# Patient Record
Sex: MALE | Race: BLACK OR AFRICAN AMERICAN
[De-identification: names, ages, dates, MRNs, and addresses within clinical notes are randomized per-mention and may not be internally consistent; named-entity substitution may affect disease eponyms.]

---

## 2017-01-01 NOTE — EMERGENCY ROOM REPORT
History of Present Illness


General


Chief Complaint:  General Complaint


Source:  Patient





Present Illness


HPI


26-year-old male presents to emergency Department complaining of right knee 

pain x2 days 7/10 in severity exacerbated upon walking long distances.  Patient 

denies history of fall, trauma or previous injury.  Patient states he notices 

the pain when he is at work after he has been walking for some time or after 

flexing the knee repeatedly.  Patient denies clicking or instability.  Patient 

denies erythema, swelling or increased pressure palpation.  Patient also 

reports 2 lesions on the anterior left shin with surrounding erythema one with 

overlying pustule the second recently the pustule burst.  Denies injury he 

states he may have had insect bite denies itching.  Reports tenderness to the 

skin about the lesions.  Lastly patient also reports intermittent cough with 

mild nasal congestion denies sore throat, body aches, nausea, vomiting, fevers 

or chills.  She denies any compromise denies history of asthma/COPD. Denies 

numbness tingling or loss of sensation or gross motor movements of the 

extremities, incontinence of bowel or bladder. Denies CP, Palpitations, LOC, AMS

, dizziness, Changes in Vision, Sensation, paresthesias, or a sudden severe 

headache.


Allergies:  


Coded Allergies:  


     No Known Allergies (Unverified , 2/15/16)





Patient History


Past Medical History:  see triage record


Past Surgical History:  none


Pertinent Family History:  none


Immunizations:  UTD


Reviewed Nursing Documentation:  PMH: Agreed, PSxH: Agreed





Nursing Documentation-PM


Past Medical History:  No History, Except For


Hx Hypertension:  Yes


Hx Asthma:  Yes





Review of Systems


All Other Systems:  negative except mentioned in HPI





Physical Exam





Vital Signs








  Date Time  Temp Pulse Resp B/P Pulse Ox O2 Delivery O2 Flow Rate FiO2


 


1/1/17 12:59 98.6 73 18 148/107 97 Room Air  








Sp02 EP Interpretation:  reviewed, normal


General Appearance:  no apparent distress, alert, GCS 15, non-toxic


Head:  normocephalic, atraumatic


Eyes:  bilateral eye PERRL, bilateral eye normal inspection


ENT:  hearing grossly normal, normal pharynx, no angioedema, normal voice, TMs 

+ canals normal, uvula midline, moist mucus membranes


Neck:  full range of motion, supple/symm/no masses


Respiratory:  chest non-tender, lungs clear, normal breath sounds, no rhonchi, 

no respiratory distress, no retraction, no accessory muscle use, no wheezing, 

speaking full sentences


Cardiovascular #1:  regular rate, rhythm, no edema


Cardiovascular #2:  2+ radial (R), 2+ radial (L), 2+ dorsalis pedis (R), 2+ 

dorsalis pedis (L)


Gastrointestinal:  normal bowel sounds, non tender, soft, no guarding, no 

rebound


Rectal:  deferred


Genitourinary:  normal inspection, no CVA tenderness


Musculoskeletal:  back normal, gait/station normal, normal range of motion, no 

calf tenderness, other - no TTP upon palpation to the right knee, no increased 

laxity upon varus or valgus stressing, negative anterior and posterior drawer 

signs Pt. did have positive pain with "grind test" , tender


Neurologic:  alert, oriented x3, responsive, motor strength/tone normal, 

sensory intact, speech normal


Psychiatric:  judgement/insight normal, memory normal, mood/affect normal, no 

suicidal/homicidal ideation


Skin:  normal color, no rash, warm/dry, well hydrated, other - two open lesions 

on the anterior left shin both 0.5cm in diameter, one with pustule noted, both 

have surrounding erythema and mild increased temperature to palpation, no 

evidence of excoriation or infestation.


Lymphatic:  no adenopathy





Procedures


Splinting


Splinting :  


   Consent:  Verbal


   Location:  right knee


   Pre-Made Type:  ACE wrap


   Pre-Proc Neuro Vasc Exam:  normal


   Post-Proc Neuro Vasc Exam:  normal


   Patient Tolerated:  Well


   Complications:  None





Medical Decision Making


PA Attestation


Dr. Gonzalez is my supervising Physician whom patient management has been 

discussed with.


Diagnostic Impression:  


 Primary Impression:  


 Right knee sprain


 Qualified Codes:  S83.91XA - Sprain of unspecified site of right knee, initial 

encounter


 Additional Impressions:  


 Cellulitis


 Qualified Codes:  L03.90 - Cellulitis, unspecified


 URI (upper respiratory infection)


 Qualified Codes:  J06.9 - Acute upper respiratory infection, unspecified; 

B97.89 - Other viral agents as the cause of diseases classified elsewhere


ER Course


Pt. presents to the ED c/o: Right knee pain after walking long distance, 

lesions on the anterior portion, and denies feversx 2 days





Ddx considered but are not limited to Fracture, dislocation, contusion,  Sprain/

Strain/Spasm,  Cellulitis,  URI, insect bite, dermatitis.


Vital signs: are WNL, pt. is afebrile


H&PE are most consistent with  cellulitis of the left shin, and right Knee 

sprain,  URI- no meningeal signs, oropharynx is not involved, no evidence of 

bacterial infection of the upper respiratory system at this time. 








ORDERS:





-  X-ray  Right knee    - negative for fx, Dislocation, or significant soft 

tissue injury, evidence of mild degenerative changes-  per preliminary read in 

ED by Dr. Alexander





ED INTERVENTIONS:


- Ace wrap applied  by ED tech. Pt. remains neurovascularly intact.


-Bacitracin and Band-Aid is applied to open lesion on the left shin. 





- Discussed the patient that if his symptoms continue despite conservative 

management that his knee may require MRI which is performed as an outpatient 

ordered by his PCP. 





DISCHARGE: At this time pt. is stable for d/c to home. Will provide printed 

patient care instructions, and any necessary prescriptions. Care plan and 

follow up instructions have been discussed with the patient prior to discharge.





Last Vital Signs








  Date Time  Temp Pulse Resp B/P Pulse Ox O2 Delivery O2 Flow Rate FiO2


 


1/1/17 12:59 98.6 73 18 148/107 97 Room Air  








Disposition:  HOME, SELF-CARE


Condition:  Stable


Scripts


Acetaminophen* (TYLENOL EXTRA STRENGTH*) 500 Mg Tablet


500 MG ORAL Q8H, #30 TAB 0 Refills


   Prov: Sheila Barakat         1/1/17 


D-Methorphan Hb/Prometh Hcl* (PROMETHAZINE-DM SYRUP*) 118 Ml Syrup


5 ML ORAL Q6H Y for For Cough, #118 ML 0 Refills


   Prov: Sheila Barakat         1/1/17 


Ibuprofen* (MOTRIN*) 600 Mg Tablet


600 MG ORAL THREE TIMES A DAY, #30 TAB 0 Refills


   Prov: Sheila Barakat.ACarmencita         1/1/17 


Cephalexin* (KEFLEX*) 500 Mg Capsule


500 MG ORAL EVERY 12 HOURS for 7 Days, #14 CAP 0 Refills


   Prov: Sheila Barakat         1/1/17


Patient Instructions:  Cellulitis, Easy-to-Read, Knee Sprain, Upper Respiratory 

Infection, Adult, Easy-to-Read





Additional Instructions:  


Take medications as directed. 


Follow up with PCP in 3-5 days 


Return sooner to ED if new symptoms occur, or current symptoms become worse.











Sheila Barakat Jan 1, 2017 13:14

## 2017-01-08 NOTE — DIAGNOSTIC IMAGING REPORT
Indication: PAIN



Technique: 3 views of the right knee



Comparison: 7/14/2016



Findings:Osseous abnormality is seen adjacent to the distal femoral 

epicondyle

posterolaterally this appears to be a focus of heterotopic ossification. No 

definite

acute fractures. No dislocations. No suprapatellar effusion. The joint spaces 

are

preserved



Impression:No acute process



Ossific density adjacent to to the posteromedial femoral epicondyle, also 

previously

described. May represent an osteochondroma or, more likely, a focus of 

heterotopic

ossification

## 2017-02-09 NOTE — EMERGENCY ROOM REPORT
History of Present Illness


General


Chief Complaint:  To Be Triaged





Present Illness


HPI


The patient was called multiple times and was not in the waiting area.  The 

patient has left without being seen


Allergies:  


Coded Allergies:  


     No Known Allergies (Unverified , 2/15/16)





Nursing Documentation-PMH


Hx Hypertension:  Yes


Hx Asthma:  Yes





Medical Decision Making


PA Attestation


Dr. Tamayo is my supervising physician. Patient management was discussed 

with my supervising physician


ER Course


The patient was called multiple times and was not in the waiting area.  The 

patient has left without being seen


Disposition:  LEFT W/OUT BEING SEEN


Condition:  Unknown











GENE CHANDLER Feb 9, 2017 17:16

## 2017-03-12 NOTE — EMERGENCY ROOM REPORT
History of Present Illness


General


Chief Complaint:  Upper Respiratory Illness


Source:  Patient





Present Illness


[facility]


The patient is a 26 all male presenting with one week of productive cough, 

subjective fevers, and sore throat.  The patient denies any sick contacts or 

recent travel.  The patient describes pain as an 8/10 dull ache to the back of 

the throat and is worse with swallowing.  Pain is nonradiating.  The patient 

denies any other symptoms including headache, nausea, vomiting, chills, night 

sweats, hemoptysis, shortness of breath, wheezing, chest pain.


The patient is also complaining of a rash which he noticed between his toes.  

This began one month prior after visiting a nail salon.  The patient denies any 

pain to these areas.  Patient has not tried any treatment for this.


Allergies:  


Coded Allergies:  


     No Known Allergies (Unverified , 2/15/16)





Patient History


Past Medical History:  see triage record


Pertinent Family History:  none


Reviewed Nursing Documentation:  PMH: Agreed, PSxH: Agreed





Nursing Documentation-PMH


Past Medical History:  No History, Except For


Hx Hypertension:  Yes


Hx Asthma:  Yes





Review of Systems


All Other Systems:  negative except mentioned in HPI





Physical Exam





Vital Signs








  Date Time  Temp Pulse Resp B/P Pulse Ox O2 Delivery O2 Flow Rate FiO2


 


3/12/17 17:49 97.9 77 16 145/79 100 Room Air  








Sp02 EP Interpretation:  reviewed, normal


General Appearance:  no apparent distress, alert, GCS 15, non-toxic


Head:  normocephalic, atraumatic


Eyes:  bilateral eye PERRL, bilateral eye normal inspection


ENT:  normal voice, uvula midline, nasal congestion, tonsillar swelling, 

pharyngeal erythema


Neck:  full range of motion, supple/symm/no masses


Respiratory:  chest non-tender, lungs clear, normal breath sounds, speaking 

full sentences


Cardiovascular #1:  regular rate, rhythm, no edema


Musculoskeletal:  back normal, gait/station normal, normal range of motion, non-

tender


Neurologic:  alert, oriented x3, responsive, motor strength/tone normal, 

sensory intact, speech normal


Psychiatric:  judgement/insight normal, memory normal, mood/affect normal, no 

suicidal/homicidal ideation


Skin:  normal color, warm/dry, well hydrated, rash - white, scaling rash 

between all toes.


Lymphatic:  adenopathy - cervical





Medical Decision Making


PA Attestation


Dr. Mcnulty is my supervising physician. Patient management was discussed with 

my supervising physician


Diagnostic Impression:  


 Primary Impression:  


 Tinea pedis


 Additional Impression:  


 Pharyngitis, acute


ER Course


The patient is a 26 all male presenting with sore throat, cough, and rash 

between toes





Differential diagnosis include but not limited to pharyngitis, sinusitis, AOM, 

bronchitis, PNA


Ddx considered include but not limited to tinea pedis, contact dermatitis, 

eczema, cellulitis





Physical exam: Vitals within normal limits.  Afebrile.  No apparent distress


HEENT exam: There is bilateral tonsillar edema, erythema, and exudate.  Uvula 

midline.  Moist mucous membranes.


+ nasal congestion. 


There is bilateral cervical lymphadenopathy.


Lungs are clear to auscultation bilaterally


Skin is warm and dry.  


There is white, scaling rash between the toes.  Slightly macerated





The patient will be discharged home with a prescription for azithromycin, 

flonase, cough medication,  Lotrimin cream, and is given ER precautions.  

Patient will followup with primary care





Last Vital Signs








  Date Time  Temp Pulse Resp B/P Pulse Ox O2 Delivery O2 Flow Rate FiO2


 


3/12/17 17:49 97.9 77 16 145/79 100 Room Air  








Status:  improved


Disposition:  HOME, SELF-CARE


Condition:  Improved


Scripts


Clotrimazole* (LOTRIMIN*) 15 Gm Cream..g.


1 APPLIC TOPIC TWICE A DAY, #15 GM


   Prov: TERZIAN,GENE P.A.         3/12/17 


Fluticasone Propionate (Flonase Allergy Relief) 9.9 Ml [facility].susp


1 SPRAYS NS DAILY, #10 ML


   Prov: TERZIAN,GENE P.A.         3/12/17 


D-Methorphan Hb/Prometh Hcl* (PROMETHAZINE-DM SYRUP*) 118 Ml Syrup


5 ML ORAL Q6H Y for For Cough, #118 ML 0 Refills


   Prov: TERZIAN,GENE P.A.         3/12/17 


Azithromycin* (ZITHROMAX*) 250 Mg Tablet


250 MG ORAL DAILY, #6 TAB 0 Refills


   Take two tables once daily for 1 day, then one tablet once daily for 4


   days.


   Prov: TERZIAN,GENE P.A.         3/12/17


Referrals:  


H CLAUDE HUDSON,REFERRING (PCP)


Patient Instructions:  Upper Respiratory Infection, Adult, Athlete's Foot





Additional Instructions:  


I discussed my findings with the patient. All questions and concerns have been 

answered. Treatment and medication compliance have been addressed. I advised 

the patient that they need to follow up with PMD in 3-5 days. Return to ED if 

pain remains or worsens, cough worsens or remains, you notice blood in your 

sputum, you notice wheezing, you experience a fever, or if needed for any 

reason. Patient verbalized understanding of discharge instructions.











GENE CHANDLER Mar 12, 2017 18:45
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
## 2017-06-06 NOTE — EMERGENCY ROOM REPORT
History of Present Illness


General


Chief Complaint:  Pain


Source:  Patient





Present Illness


HPI


Patient is a 26-year-old male who presented after increased cough and a right-

sided knee pain.  Patient stated this had gradual onset.  Patient had a 

nonproductive cough he stated that he had prior injury to his right knee which 

become more painful.  Patient stated he had been having some nausea as well as 

some vomiting.  He denies abdominal pain.  He denied diarrhea.


Allergies:  


Coded Allergies:  


     No Known Allergies (Unverified , 2/15/16)





Patient History


Past Medical History:  see triage record


Reviewed Nursing Documentation:  PMH: Agreed, PSxH: Agreed





Nursing Documentation-PM


Past Medical History:  No History, Except For


Hx Hypertension:  Yes


Hx Asthma:  Yes





Review of Systems


All Other Systems:  negative except mentioned in HPI





Physical Exam





Vital Signs








  Date Time  Temp Pulse Resp B/P Pulse Ox O2 Delivery O2 Flow Rate FiO2


 


6/3/17 04:10 97.9 60 16 146/96 98 Room Air  








General Appearance:  well appearing, no apparent distress, alert, GCS 15, non-

toxic, obese


Head:  normocephalic, atraumatic


ENT:  hearing grossly normal, normal voice


Neck:  full range of motion, supple


Respiratory:  lungs clear, normal breath sounds, no respiratory distress, 

speaking full sentences


Cardiovascular #1:  normal peripheral pulses, regular rate, rhythm, edema - 

bilateral lower extremity


Gastrointestinal:  normal bowel sounds, non tender, soft


Musculoskeletal:  no calf tenderness


Neurologic:  normal inspection, alert, oriented x3, responsive, normal gait


Psychiatric:  mood/affect normal


Skin:  no rash





Medical Decision Making


Diagnostic Impression:  


 Primary Impression:  


 Knee pain


 Additional Impression:  


 URI (upper respiratory infection)


ER Course


Patient presented for  cough.Differential diagnosis included but was not 

limited to bronchitis, pneumonia, pulmonary embolism, pericarditis, asthma, 

foreign body.


Chest x-ray was ordered due to patient's recent cough.  A chest x-ray one view 

interpreted by me showed normal cardiac size without evident infiltrate there 

is no evident pneumothorax.  The patient noted to have a right knee pain which 

appears to be chronic in nature.  Patient was advised to follow up with primary 

care physician for further evaluation and treatment.  He was given ibuprofen 

prescription.   he was given prescription for nausea medication.  The  patient 

is likely of a viral respiratory infection He shows no signs of respiratory 

distress.The patient is advised to follow up with  primary care doctor in 1-2 

days.  Patient is advised to return if any worsening condition or if any 

changes in status that are concerning.





Last Vital Signs








  Date Time  Temp Pulse Resp B/P Pulse Ox O2 Delivery O2 Flow Rate FiO2


 


6/3/17 05:37 97.9 80 16 135/90 98 Room Air  








Status:  improved


Disposition:  HOME, SELF-CARE


Condition:  Stable


Scripts


Ondansetron (Zofran) 4 Mg Tablet


4 MG ORAL Q6H Y for Nausea & Vomiting, #30 TAB 0 Refills


   Prov: Vinay Alexander         6/3/17 


Albuterol Sulfate* (ALBUTEROL SULFATE MDI*) 8.5 Gm [facility]fa.aer.ad


2 PUFF INH Q6H, #1 INH 0 Refills


   Prov: Vinay Alexander         6/3/17 


Ibuprofen* (MOTRIN*) 600 Mg Tablet


600 MG ORAL Q8H Y for For Pain, #30 TAB 0 Refills


   Prov: Vinay Alexander         6/3/17 


Loratadine (CLARITIN) 10 Mg Tablet


10 MG ORAL DAILY, #30 TAB


   Prov: Vinay Alexander         6/3/17


Referrals:  


[facility],REFERRING (PCP)


Patient Instructions:  Upper Respiratory Infection, Adult, Easy-to-Read











Vinay Alexander Jun 6, 2017 07:56

## 2017-08-09 NOTE — EMERGENCY ROOM REPORT
History of Present Illness


General


Chief Complaint:  General Complaint


Source:  Patient





Present Illness


Allergies:  


Coded Allergies:  


     No Known Allergies (Unverified , 2/15/16)





Patient History


Past Medical History:  none


Past Surgical History:  none





Nursing Documentation-PM


Past Medical History:  No History, Except For


Hx Hypertension:  Yes


Hx Asthma:  Yes





Review of Systems


Gastrointestinal:  Reports: abdominal pain


Neurological:  Reports: headache


All Other Systems:  negative except mentioned in HPI





Physical Exam





Vital Signs








  Date Time  Temp Pulse Resp B/P Pulse Ox O2 Delivery O2 Flow Rate FiO2


 


8/9/17 16:19 97.9 79 16 148/78 95 Room Air  








Sp02 EP Interpretation:  normal


General Appearance:  normal inspection, well appearing - conversing 

appropriately at bedside, no apparent distress, alert, GCS 15, non-toxic


Head:  normocephalic, atraumatic


Eyes:  bilateral eye EOMI, bilateral eye normal inspection


ENT:  normal ENT inspection, normal pharynx, normal voice, moist mucus membranes


Neck:  normal inspection, full range of motion, supple, no bony tend


Respiratory:  normal inspection, lungs clear, normal breath sounds, no 

respiratory distress, no retraction, no wheezing, speaking full sentences, 

chest symmetrical


Cardiovascular #1:  normal inspection, regular rate, rhythm, no edema, normal 

capillary refill


Gastrointestinal:  normal inspection, non tender, soft, non-distended, no 

guarding, other - no tenderness to deep palpation all quadrants, overweight


Genitourinary:  no CVA tenderness


Musculoskeletal:  normal inspection, back normal, normal range of motion, non-

tender


Neurologic:  normal inspection, alert, oriented x3, responsive, CNs III-XII nml 

as tested, motor strength/tone normal, sensory intact, normal gait, speech 

normal





Medical Decision Making


Diagnostic Impression:  


 Primary Impression:  


 Headache


 Additional Impression:  


 Abdominal pain, chronic, epigastric


ER Course


28 yo M no sig pmhx p/w 2 days HA, ~3 wks chronic epigastric pain.





DDX: primary HA/ migraine / dehydration 


at this time no imaging required - not c/w intracranial process / bleed  as 

patient appears well, no neurological sx or findings





chronic abd pain: gerd/gastritis





ER course:


patient offered tylenol for pain as motrin bothers his stomach, but he refused. 

Also offered to place IV for reglan/benadryl but also refused as he is driving. 

States pain is "not that bad" right now. remained stable and ambulatory in ED. 





Disposition:


Patient DC'ed back to home


Instructed to follow up with PMD. See a neurologist if HA worsens. Instructed 

to come back to ED if severe ha, n/v, blurry vision. Dietary modification 

instructed for abd pain, told if pain returns or worsens to come back to ED. Pt 

verbalized understanding and agrees with plan





Last Vital Signs








  Date Time  Temp Pulse Resp B/P Pulse Ox O2 Delivery O2 Flow Rate FiO2


 


8/9/17 16:19 97.9 79 16 148/78 95 Room Air  








Disposition:  HOME, SELF-CARE


Condition:  Stable


Scripts


Famotidine (PEPCID) 40 Mg Tablet


40 MG PO DAILY, #7 TAB 0 Refills


   Prov: Mounika Subramanian M.D.         8/9/17











Mounika Subramanian M.D. Aug 9, 2017 16:42

## 2017-09-04 NOTE — EMERGENCY ROOM REPORT
History of Present Illness


General


Chief Complaint:  Pain


Source:  Patient





Present Illness


HPI


Is a 27-year-old male who has a history of chronic knee and shoulder pain.  He 

presents with chief complaint of right knee pain.  Exacerbated from his work.  

No trauma.  Throbbing nature.  Denies any other complaint.  Pain is 8/10.  Out 

of his pain medication.


Allergies:  


Coded Allergies:  


     No Known Allergies (Unverified , 2/15/16)





Patient History


Past Medical History:  see triage record, old chart reviewed


Past Surgical History:  other


Pertinent Family History:  none


Social History:  Denies: smoking


Immunizations:  other


Reviewed Nursing Documentation:  PMH: Agreed, PSxH: Agreed





Nursing Documentation-PMH


Hx Hypertension:  Yes


Hx Asthma:  Yes





Review of Systems


Eye:  Denies: eye pain, blurred vision


ENT:  Denies: ear pain, nose congestion, throat swelling


Respiratory:  Denies: cough, shortness of breath


Cardiovascular:  Denies: chest pain, palpitations


Gastrointestinal:  Denies: abdominal pain, diarrhea, nausea, vomiting


Musculoskeletal:  Reports: joint pain, Denies: back pain


Skin:  Denies: rash


Neurological:  Denies: headache, numbness


Endocrine:  Denies: increased thirst, increased urine


Hematologic/Lymphatic:  Denies: easy bruising


All Other Systems:  negative except mentioned in HPI





Physical Exam





Vital Signs








  Date Time  Temp Pulse Resp B/P (MAP) Pulse Ox O2 Delivery O2 Flow Rate FiO2


 


9/4/17 00:04 98.1 63 18 139/88 96 Room Air  





vitals normal


Sp02 EP Interpretation:  reviewed, normal


General Appearance:  well appearing, no apparent distress, alert, obese


Head:  normocephalic, atraumatic


Eyes:  bilateral eye PERRL, bilateral eye EOMI


ENT:  hearing grossly normal, normal pharynx


Neck:  full range of motion, supple, no meningismus


Respiratory:  chest non-tender, lungs clear, normal breath sounds


Cardiovascular #1:  regular rate, rhythm, no murmur


Gastrointestinal:  normal bowel sounds, non tender, no mass, no organomegaly, 

no bruit, non-distended


Musculoskeletal:  back normal, gait/station normal, normal range of motion, 

other - Mild tenderness to te.  Full reyna of motion.  He walked without 

difficulty.


Psychiatric:  mood/affect normal


Skin:  warm/dry





Medical Decision Making


Diagnostic Impression:  


 Primary Impression:  


 Knee pain


 Qualified Codes:  M25.561 - Pain in right knee


ER Course


Patient presents with acute on chronic right knee pain.  He had a recent MRI 

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  He's been 

here multiple time for the same thing.  I see no need for further workup or x-

rays.  I see no evidence of issue with his knee.





Last Vital Signs








  Date Time  Temp Pulse Resp B/P (MAP) Pulse Ox O2 Delivery O2 Flow Rate FiO2


 


9/4/17 00:04 98.1 63 18 139/88 96 Room Air  








Status:  improved


Disposition:  HOME, SELF-CARE


Condition:  Stable


Scripts


Ibuprofen* (MOTRIN*) 600 Mg Tablet


600 MG ORAL THREE TIMES A DAY, #30 TAB 0 Refills


   Prov: RICHARD DUNBAR M.D.         9/4/17


Patient Instructions:  Chronic Pain





Additional Instructions:  


Followup with your Dr. in 7 days.  There is no need for you to keep returning 

to the ER for the same pain in your knee.  Return if symptom worsen.











RICHARD DUNBAR M.D. Sep 4, 2017 00:32

## 2017-09-23 NOTE — EMERGENCY ROOM REPORT
History of Present Illness


General


Chief Complaint:  Skin Rash/Abscess


Source:  Patient


 (Cassandra Stanton)





Present Illness


HPI


Patient is a 27-year-old male with no significant past medical history who 

presents today with complaints of an insect bite to the left forearm.  He 

states he noticed it several days ago and "popped" the index high at and notes 

a scant amount of pus came out.  She states that "bump" it has been itching him 

as well.  He has not used any medications.  Patient also complaining of 

cramping in his bilateral  the past several days.  He denies fever, 

chills, chest pain, shortness of breath or associated symptoms.


 (Cassandra Stanton)


Allergies:  


Coded Allergies:  


     No Known Allergies (Unverified , 2/15/16)





Patient History


Reviewed Nursing Documentation:  PMH: Agreed, PSxH: Agreed (Cassandra Stanton)





Nursing Documentation-PMH


Hx Hypertension:  Yes


Hx Asthma:  Yes


 (Cassandra Stanton)





Review of Systems


Skin:  Reports: rash


All Other Systems:  negative except mentioned in HPI


 (Cassandra Stanton.ACarmencita)





Physical Exam





Vital Signs








  Date Time  Temp Pulse Resp B/P (MAP) Pulse Ox O2 Delivery O2 Flow Rate FiO2


 


9/23/17 18:09 98.4 72 17 160/66 96 Room Air  








Sp02 EP Interpretation:  reviewed, normal


General Appearance:  no apparent distress, alert, GCS 15, non-toxic


Head:  normocephalic, atraumatic


Eyes:  bilateral eye normal inspection, bilateral eye PERRL


ENT:  hearing grossly normal, normal pharynx, no angioedema, normal voice


Neck:  full range of motion, supple/symm/no masses


Respiratory:  chest non-tender, lungs clear, normal breath sounds, speaking 

full sentences


Cardiovascular #1:  regular rate, rhythm, no edema


Cardiovascular #2:  2+ carotid (R), 2+ carotid (L), 2+ radial (R), 2+ radial (L)

, 2+ dorsalis pedis (R), 2+ dorsalis pedis (L)


Gastrointestinal:  normal bowel sounds, non tender, soft, non-distended, no 

guarding, no rebound


Rectal:  deferred


Genitourinary:  normal inspection, no CVA tenderness


Musculoskeletal:  back normal, gait/station normal, normal range of motion, non-

tender, other - negative emelia's sign


Neurologic:  alert, oriented x3, responsive, motor strength/tone normal, 

sensory intact, speech normal


Psychiatric:  judgement/insight normal, memory normal, mood/affect normal, no 

suicidal/homicidal ideation


Reflexes:  3+ bicep (R), 3+ bicep (L), 3+ tricep (R), 3+ tricep (L), 3+ knee (R)

, 3+ knee (L)


Skin:  normal color, warm/dry, well hydrated, other - 1 cm area of induration 

to the medial aspect of the left forearm with no tenderness to palpation, no 

erythema


Lymphatic:  no adenopathy


 (Cassandra Stanton)





Medical Decision Making


PA Attestation


supervising physician is Dr. Mcnulty


 (Cassandra Stanton)


Diagnostic Impression:  


 Primary Impression:  


 Insect bite


 Qualified Codes:  W57.XXXA - Bitten or stung by nonvenomous insect and other 

nonvenomous arthropods, initial encounter


ER Course


She presents today with a man insect bites to the left forearm.  Patient has a 

small area of induration with no overlying erythema.  There is no need for 

incision and drainage at this time.  At no need for systemic antibiotics as 

there is no overlying cellulitis.  Patient's instructions use warm compresses 

every 4 hours and to follow up with primary doctor in 2 days.  Given return 

precautions.  The patient also complaining of cramping to bilateral calves, low 

index of suspicion for DVT.  Patient instructed to increase by mouth fluids, 

stretching and followup with primary care physician for reevaluation.  Pt 

understands and is agreeable to plan.


 (Cassandra Stanton P.A.)





Last Vital Signs








  Date Time  Temp Pulse Resp B/P (MAP) Pulse Ox O2 Delivery O2 Flow Rate FiO2


 


9/23/17 18:09 98.4 72 17 160/66 96 Room Air  








Status:  improved


 (Cassandra Stanton.A.)





Last Vital Signs








  Date Time  Temp Pulse Resp B/P (MAP) Pulse Ox O2 Delivery O2 Flow Rate FiO2


 


9/23/17 18:40 98.4 75 16 157/64 99 Room Air  








Status:  unchanged


 (José Miguel Mcnulty M.D.)


Disposition:  HOME, SELF-CARE


Condition:  Stable


Referrals:  


NON PHYSICIAN (PCP)











Cassandra Stanton Sep 23, 2017 18:31


José Miguel Mcnulty M.D. Sep 24, 2017 03:52

## 2017-11-03 NOTE — EMERGENCY ROOM REPORT
History of Present Illness


General


Chief Complaint:  Flu Like Symptoms


Source:  Patient





Present Illness


HPI


27-year-old male presents to the emergency department complaining of cough with 

increased mucus, nasal congestion, rhinorrhea, sore throat 6/10 in severity  x4 

days.  Patient denies fevers he does report several episodes of cold sweats.  

Patient denies and travel he states he works as a  at the 

[employer] [facility] [place] [employer] around any ill people.  He is up-to-date with his yearly flu 

vaccination he received a last month.Denies CP, Palpitations, LOC, AMS, 

dizziness, Changes in Vision, Sensation, paresthesias, or a sudden severe 

headache.


Allergies:  


Coded Allergies:  


     No Known Allergies (Unverified , 2/15/16)





Patient History


Past Medical History:  see triage record


Past Surgical History:  none


Pertinent Family History:  none


Immunizations:  UTD


Reviewed Nursing Documentation:  PMH: Agreed, PSxH: Agreed





Nursing Documentation-PMH


Hx Hypertension:  Yes


Hx Asthma:  Yes





Review of Systems


All Other Systems:  negative except mentioned in HPI





Physical Exam





Vital Signs








  Date Time  Temp Pulse Resp B/P (MAP) Pulse Ox O2 Delivery O2 Flow Rate FiO2


 


11/3/17 16:31 98.2 69 17 126/79 95 Room Air  








Sp02 EP Interpretation:  reviewed, normal


General Appearance:  no apparent distress, alert, GCS 15, non-toxic


Head:  normocephalic, atraumatic


Eyes:  bilateral eye normal inspection, bilateral eye PERRL


ENT:  hearing grossly normal, normal pharynx, no angioedema, normal voice, TMs 

+ canals normal, uvula midline, moist mucus membranes, nasal congestion, 

pharyngeal erythema


Neck:  full range of motion, no meningismus, no bony tend, supple/symm/no masses


Respiratory:  lungs clear, normal breath sounds, speaking full sentences


Cardiovascular #1:  regular rate, rhythm, normal capillary refill


Musculoskeletal:  back normal, gait/station normal, normal range of motion, non-

tender


Neurologic:  alert, oriented x3, responsive, motor strength/tone normal, 

sensory intact, speech normal


Skin:  normal color, no rash, warm/dry, well hydrated


Lymphatic:  no adenopathy





Medical Decision Making


PA Attestation


Dr. Mcnulty is my supervising Physician whom patient management has been 

discussed with.


Diagnostic Impression:  


 Primary Impression:  


 URI (upper respiratory infection)


 Qualified Codes:  J06.9 - Acute upper respiratory infection, unspecified; 

B97.89 - Other viral agents as the cause of diseases classified elsewhere


ER Course


27-year-old male presents to the emergency department complaining of cough with 

increased mucus, nasal congestion, rhinorrhea, sore throat 6/10 in severity  x4 

days.  Patient denies fevers he does report several episodes of cold sweats.  

Patient denies and travel he states he works as a  at the 

[employer] [facility] [place] synergies around any ill people.  He is up-to-date with his yearly flu 

vaccination he received a last month.Denies CP, Palpitations, LOC, AMS, 

dizziness, Changes in Vision, Sensation, paresthesias, or a sudden severe 

headache.





Ddx considered but are not limited to URI, pneumonia, PE, strep pharyngitis, 

meningitis.





Vital signs: Pt. is afebrile, the remaining VS are WNL


H&PE are most consistent with URI- no meningeal signs, oropharynx is not 

involved, no evidence of bacterial infection at this time. 





ORDERS: none required at this time, the diagnosis is clinical





ED INTERVENTIONS: None required at this time. 








--PT. EDUCATION: Discussed antibiotic resistance with inappropriate prescribing 

of antibiotics for viral illnesses.  Discussed signs and symptoms to indicate 

viral illness versus bacterial illness. 





DISCHARGE: At this time pt. is stable for d/c to home. Will provide printed 

patient care instructions, and any necessary prescriptions. Care plan and 

follow up instructions have been discussed with the patient prior to discharge.





Last Vital Signs








  Date Time  Temp Pulse Resp B/P (MAP) Pulse Ox O2 Delivery O2 Flow Rate FiO2


 


11/3/17 16:31 98.2 69 17 126/79 95 Room Air  








Disposition:  HOME, SELF-CARE


Condition:  Stable


Scripts


Acetaminophen* (TYLENOL EXTRA STRENGTH*) 500 Mg Tablet


500 MG ORAL Q6H Y for Mild Pain/Temp > 100.5, #20 TAB 0 Refills


   Prov: Sheila Barakat P.A.         11/3/17 


Guaifenesin (Guaifenesin) 1,200 Mg Tab.er.12h


1200 MG PO Q12HR for 10 Days, #20 TAB


   Prov: Sheila Barakat P.A.         11/3/17 


Codeine/Promethazine Hcl* (PROMETHAZINE-CODEINE SYRUP*) 118 Ml Syrup


5 ML ORAL Q6H Y for For Cough, #120 ML 0 Refills


   Prov: Sheila Barakat         11/3/17


Departure Forms:  Return to Work      Return to Work Date:  Nov 7, 2017


   Work Restrictions:  None


      Return to Full Activity:  Nov 7, 2017


Patient Instructions:  Upper Respiratory Infection, Adult





Additional Instructions:  


Take medications as directed. 


 ** Follow up with a Primary Care Provider in 3-5 days, even if your symptoms 

have resolved. ** 


--Please review list of primary care clinics, if you do not already have a 

primary care provider





Return sooner to ED if new symptoms occur, or current symptoms become worse. 


Do not drink alcohol, drive, or operate heavy machinery while taking Cough 

Syrup as this may cause drowsiness. 











- Please note that this Emergency Department Report was dictated using [employer] technology software, occasionally this can lead to 

erroneous entry secondary to interpretation by the dictation equipment.











Sheila Barakat Nov 3, 2017 16:58

## 2017-11-03 NOTE — EMERGENCY ROOM REPORT
History of Present Illness


General


Chief Complaint:  Flu Like Symptoms


Source:  Patient





Present Illness


HPI


27-year-old male presents to the emergency department complaining of cough with 

increased mucus, nasal congestion, rhinorrhea, sore throat 6/10 in severity  x4 

days.  Patient denies fevers he does report several episodes of cold sweats.  

Patient denies and travel he states he works as a  at the 

[employer] [facility] synergies around any ill people.  He is up-to-date with his yearly flu 

vaccination he received a last month.Denies CP, Palpitations, LOC, AMS, 

dizziness, Changes in Vision, Sensation, paresthesias, or a sudden severe 

headache.


Allergies:  


Coded Allergies:  


     No Known Allergies (Unverified , 2/15/16)





Patient History


Past Medical History:  see triage record


Past Surgical History:  none


Pertinent Family History:  none


Immunizations:  UTD


Reviewed Nursing Documentation:  PMH: Agreed, PSxH: Agreed





Nursing Documentation-PMH


Hx Hypertension:  Yes


Hx Asthma:  Yes





Review of Systems


All Other Systems:  negative except mentioned in HPI





Physical Exam





Vital Signs








  Date Time  Temp Pulse Resp B/P (MAP) Pulse Ox O2 Delivery O2 Flow Rate FiO2


 


11/3/17 16:31 98.2 69 17 126/79 95 Room Air  








Sp02 EP Interpretation:  reviewed, normal


General Appearance:  no apparent distress, alert, GCS 15, non-toxic


Head:  normocephalic, atraumatic


Eyes:  bilateral eye normal inspection, bilateral eye PERRL


ENT:  hearing grossly normal, normal pharynx, no angioedema, normal voice, TMs 

+ canals normal, uvula midline, moist mucus membranes, nasal congestion, 

pharyngeal erythema


Neck:  full range of motion, no meningismus, no bony tend, supple/symm/no masses


Respiratory:  lungs clear, normal breath sounds, speaking full sentences


Cardiovascular #1:  regular rate, rhythm, normal capillary refill


Musculoskeletal:  back normal, gait/station normal, normal range of motion, non-

tender


Neurologic:  alert, oriented x3, responsive, motor strength/tone normal, 

sensory intact, speech normal


Skin:  normal color, no rash, warm/dry, well hydrated


Lymphatic:  no adenopathy





Medical Decision Making


PA Attestation


Dr. Mcnulty is my supervising Physician whom patient management has been 

discussed with.


Diagnostic Impression:  


 Primary Impression:  


 URI (upper respiratory infection)


 Qualified Codes:  J06.9 - Acute upper respiratory infection, unspecified; 

B97.89 - Other viral agents as the cause of diseases classified elsewhere


ER Course


27-year-old male presents to the emergency department complaining of cough with 

increased mucus, nasal congestion, rhinorrhea, sore throat 6/10 in severity  x4 

days.  Patient denies fevers he does report several episodes of cold sweats.  

Patient denies and travel he states he works as a  at the 

[employer] [facility] synergies around any ill people.  He is up-to-date with his yearly flu 

vaccination he received a last month.Denies CP, Palpitations, LOC, AMS, 

dizziness, Changes in Vision, Sensation, paresthesias, or a sudden severe 

headache.





Ddx considered but are not limited to URI, pneumonia, PE, strep pharyngitis, 

meningitis.





Vital signs: Pt. is afebrile, the remaining VS are WNL


H&PE are most consistent with URI- no meningeal signs, oropharynx is not 

involved, no evidence of bacterial infection at this time. 





ORDERS: none required at this time, the diagnosis is clinical





ED INTERVENTIONS: None required at this time. 








--PT. EDUCATION: Discussed antibiotic resistance with inappropriate prescribing 

of antibiotics for viral illnesses.  Discussed signs and symptoms to indicate 

viral illness versus bacterial illness. 





DISCHARGE: At this time pt. is stable for d/c to home. Will provide printed 

patient care instructions, and any necessary prescriptions. Care plan and 

follow up instructions have been discussed with the patient prior to discharge.





Last Vital Signs








  Date Time  Temp Pulse Resp B/P (MAP) Pulse Ox O2 Delivery O2 Flow Rate FiO2


 


11/3/17 16:31 98.2 69 17 126/79 95 Room Air  








Disposition:  HOME, SELF-CARE


Condition:  Stable


Scripts


Acetaminophen* (TYLENOL EXTRA STRENGTH*) 500 Mg Tablet


500 MG ORAL Q6H Y for Mild Pain/Temp > 100.5, #20 TAB 0 Refills


   Prov: Sheila Barakat P.A.         11/3/17 


Guaifenesin (Guaifenesin) 1,200 Mg Tab.er.12h


1200 MG PO Q12HR for 10 Days, #20 TAB


   Prov: Sheila Barakat P.A.         11/3/17 


Codeine/Promethazine Hcl* (PROMETHAZINE-CODEINE SYRUP*) 118 Ml Syrup


5 ML ORAL Q6H Y for For Cough, #120 ML 0 Refills


   Prov: Sheila Barakat         11/3/17


Departure Forms:  Return to Work      Return to Work Date:  Nov 7, 2017


   Work Restrictions:  None


      Return to Full Activity:  Nov 7, 2017


Patient Instructions:  Upper Respiratory Infection, Adult





Additional Instructions:  


Take medications as directed. 


 ** Follow up with a Primary Care Provider in 3-5 days, even if your symptoms 

have resolved. ** 


--Please review list of primary care clinics, if you do not already have a 

primary care provider





Return sooner to ED if new symptoms occur, or current symptoms become worse. 


Do not drink alcohol, drive, or operate heavy machinery while taking Cough 

Syrup as this may cause drowsiness. 











- Please note that this Emergency Department Report was dictated using [employer] technology software, occasionally this can lead to 

erroneous entry secondary to interpretation by the dictation equipment.











Sheila Barakat Nov 3, 2017 16:58

## 2017-11-03 NOTE — EMERGENCY ROOM REPORT
History of Present Illness


General


Chief Complaint:  Flu Like Symptoms


Source:  Patient





Present Illness


HPI


27-year-old male presents to the emergency department complaining of cough with 

increased mucus, nasal congestion, rhinorrhea, sore throat 6/10 in severity  x4 

days.  Patient denies fevers he does report several episodes of cold sweats.  

Patient denies and travel he states he works as a  at the 

\[employer]\"" synergies around any ill people.  He is up-to-date with his yearly flu 

vaccination he received a last month.Denies CP, Palpitations, LOC, AMS, 

dizziness, Changes in Vision, Sensation, paresthesias, or a sudden severe 

headache.


Allergies:  


Coded Allergies:  


     No Known Allergies (Unverified , 2/15/16)





Patient History


Past Medical History:  see triage record


Past Surgical History:  none


Pertinent Family History:  none


Immunizations:  UTD


Reviewed Nursing Documentation:  PMH: Agreed, PSxH: Agreed





Nursing Documentation-PMH


Hx Hypertension:  Yes


Hx Asthma:  Yes





Review of Systems


All Other Systems:  negative except mentioned in HPI





Physical Exam





Vital Signs








  Date Time  Temp Pulse Resp B/P (MAP) Pulse Ox O2 Delivery O2 Flow Rate FiO2


 


11/3/17 16:31 98.2 69 17 126/79 95 Room Air  








Sp02 EP Interpretation:  reviewed, normal


General Appearance:  no apparent distress, alert, GCS 15, non-toxic


Head:  normocephalic, atraumatic


Eyes:  bilateral eye normal inspection, bilateral eye PERRL


ENT:  hearing grossly normal, normal pharynx, no angioedema, normal voice, TMs 

+ canals normal, uvula midline, moist mucus membranes, nasal congestion, 

pharyngeal erythema


Neck:  full range of motion, no meningismus, no bony tend, supple/symm/no masses


Respiratory:  lungs clear, normal breath sounds, speaking full sentences


Cardiovascular #1:  regular rate, rhythm, normal capillary refill


Musculoskeletal:  back normal, gait/station normal, normal range of motion, non-

tender


Neurologic:  alert, oriented x3, responsive, motor strength/tone normal, 

sensory intact, speech normal


Skin:  normal color, no rash, warm/dry, well hydrated


Lymphatic:  no adenopathy





Medical Decision Making


PA Attestation


Dr. Mcnulty is my supervising Physician whom patient management has been 

discussed with.


Diagnostic Impression:  


 Primary Impression:  


 URI (upper respiratory infection)


 Qualified Codes:  J06.9 - Acute upper respiratory infection, unspecified; 

B97.89 - Other viral agents as the cause of diseases classified elsewhere


ER Course


27-year-old male presents to the emergency department complaining of cough with 

increased mucus, nasal congestion, rhinorrhea, sore throat 6/10 in severity  x4 

days.  Patient denies fevers he does report several episodes of cold sweats.  

Patient denies and travel he states he works as a  at the 

\[employer]\"" synergies around any ill people.  He is up-to-date with his yearly flu 

vaccination he received a last month.Denies CP, Palpitations, LOC, AMS, 

dizziness, Changes in Vision, Sensation, paresthesias, or a sudden severe 

headache.





Ddx considered but are not limited to URI, pneumonia, PE, strep pharyngitis, 

meningitis.





Vital signs: Pt. is afebrile, the remaining VS are WNL


H&PE are most consistent with URI- no meningeal signs, oropharynx is not 

involved, no evidence of bacterial infection at this time. 





ORDERS: none required at this time, the diagnosis is clinical





ED INTERVENTIONS: None required at this time. 








--PT. EDUCATION: Discussed antibiotic resistance with inappropriate prescribing 

of antibiotics for viral illnesses.  Discussed signs and symptoms to indicate 

viral illness versus bacterial illness. 





DISCHARGE: At this time pt. is stable for d/c to home. Will provide printed 

patient care instructions, and any necessary prescriptions. Care plan and 

follow up instructions have been discussed with the patient prior to discharge.





Last Vital Signs








  Date Time  Temp Pulse Resp B/P (MAP) Pulse Ox O2 Delivery O2 Flow Rate FiO2


 


11/3/17 16:31 98.2 69 17 126/79 95 Room Air  








Disposition:  HOME, SELF-CARE


Condition:  Stable


Scripts


Acetaminophen* (TYLENOL EXTRA STRENGTH*) 500 Mg Tablet


500 MG ORAL Q6H Y for Mild Pain/Temp > 100.5, #20 TAB 0 Refills


   Prov: Sheila Barakat P.A.         11/3/17 


Guaifenesin (Guaifenesin) 1,200 Mg Tab.er.12h


1200 MG PO Q12HR for 10 Days, #20 TAB


   Prov: Sheila Barakat P.A.         11/3/17 


Codeine/Promethazine Hcl* (PROMETHAZINE-CODEINE SYRUP*) 118 Ml Syrup


5 ML ORAL Q6H Y for For Cough, #120 ML 0 Refills


   Prov: Sheila Barakat         11/3/17


Departure Forms:  Return to Work      Return to Work Date:  Nov 7, 2017


   Work Restrictions:  None


      Return to Full Activity:  Nov 7, 2017


Patient Instructions:  Upper Respiratory Infection, Adult





Additional Instructions:  


Take medications as directed. 


 ** Follow up with a Primary Care Provider in 3-5 days, even if your symptoms 

have resolved. ** 


--Please review list of primary care clinics, if you do not already have a 

primary care provider





Return sooner to ED if new symptoms occur, or current symptoms become worse. 


Do not drink alcohol, drive, or operate heavy machinery while taking Cough 

Syrup as this may cause drowsiness. 











- Please note that this Emergency Department Report was dictated using [employer] technology software, occasionally this can lead to 

erroneous entry secondary to interpretation by the dictation equipment.











Sheila Barakat Nov 3, 2017 16:58

## 2017-12-19 NOTE — EMERGENCY ROOM REPORT
History of Present Illness


General


Chief Complaint:  Upper Respiratory Illness


Source:  Patient





Present Illness


HPI


Is a 27-year-old male who presents with chief complaint of cough productive 

sputum.  He been ongoing for 2 and half weeks.  Initially clear but now darkish 

sputum.  No fever chills but no nausea no vomiting.  Over-the-counter 

medication helping.  Denies any chest pain.  Denies any leg swelling.


Allergies:  


Coded Allergies:  


     No Known Allergies (Unverified , 2/15/16)





Patient History


Past Medical History:  see triage record, old chart reviewed


Past Surgical History:  other


Pertinent Family History:  none


Social History:  Denies: smoking


Immunizations:  other


Reviewed Nursing Documentation:  PMH: Agreed, PSxH: Agreed





Nursing Documentation-PMH


Hx Hypertension:  Yes


Hx Asthma:  Yes





Review of Systems


Eye:  Denies: eye pain, blurred vision


ENT:  Denies: ear pain, nose congestion, throat swelling


Respiratory:  Reports: cough, sputum, Denies: shortness of breath


Cardiovascular:  Denies: chest pain, palpitations


Gastrointestinal:  Denies: abdominal pain, diarrhea, nausea, vomiting


Musculoskeletal:  Denies: back pain, joint pain


Skin:  Denies: rash


Neurological:  Denies: headache, numbness


Endocrine:  Denies: increased thirst, increased urine


Hematologic/Lymphatic:  Denies: easy bruising


All Other Systems:  negative except mentioned in HPI





Physical Exam





Vital Signs








  Date Time  Temp Pulse Resp B/P (MAP) Pulse Ox O2 Delivery O2 Flow Rate FiO2


 


12/19/17 20:38 98.1 68 18 134/85 98   


 


12/19/17 20:42      Room Air  





vitals normal


Sp02 EP Interpretation:  reviewed, normal


General Appearance:  well appearing, no apparent distress, alert, obese


Head:  normocephalic, atraumatic


Eyes:  bilateral eye PERRL, bilateral eye EOMI


ENT:  hearing grossly normal, normal pharynx


Neck:  full range of motion, supple, no meningismus


Respiratory:  chest non-tender, lungs clear, normal breath sounds


Cardiovascular #1:  regular rate, rhythm, no murmur


Gastrointestinal:  normal bowel sounds, non tender, no mass, no organomegaly, 

no bruit, non-distended


Musculoskeletal:  back normal, gait/station normal, normal range of motion


Psychiatric:  mood/affect normal


Skin:  warm/dry





Medical Decision Making


Diagnostic Impression:  


 Primary Impression:  


 URI (upper respiratory infection)


 Qualified Codes:  J06.9 - Acute upper respiratory infection, unspecified


ER Course


Patient with an upper respiratory infection.  Most likely viral but increasing 

sputum production and change in color with duration, may be atypical pneumonia, 

mycoplasma.  I will treat with antibiotics since this has been 2 and half 

weeks.  No evidence of ACS, PE, dissection to name a few.





Last Vital Signs








  Date Time  Temp Pulse Resp B/P (MAP) Pulse Ox O2 Delivery O2 Flow Rate FiO2


 


12/19/17 20:42  68 18   Room Air  


 


12/19/17 20:38 98.1   134/85 98   








Status:  unchanged


Disposition:  HOME, SELF-CARE


Condition:  Stable


Scripts


Azithromycin* (ZITHROMAX*) 250 Mg Tablet


250 MG ORAL DAILY, #6 TAB 0 Refills


   Take two tablets by mouth today, then take one tablet by mouth daily


   for four days


   Prov: RICHARD DUNBAR M.D.         12/19/17


Patient Instructions:  Upper Respiratory Infection, Adult





Additional Instructions:  


Followup with your Dr. in 7 days.  Return if symptom worsen.











RICHARD DUNBAR M.D. Dec 19, 2017 21:11
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
## 2018-09-28 ENCOUNTER — HOSPITAL ENCOUNTER (EMERGENCY)
Dept: HOSPITAL 72 - EMR | Age: 28
Discharge: HOME | End: 2018-09-28
Payer: MEDICAID

## 2018-09-28 VITALS — DIASTOLIC BLOOD PRESSURE: 79 MMHG | SYSTOLIC BLOOD PRESSURE: 153 MMHG

## 2018-09-28 VITALS — WEIGHT: 315 LBS | BODY MASS INDEX: 41.75 KG/M2 | HEIGHT: 73 IN

## 2018-09-28 DIAGNOSIS — L02.413: Primary | ICD-10-CM

## 2018-09-28 PROCEDURE — 10060 I&D ABSCESS SIMPLE/SINGLE: CPT

## 2018-09-28 PROCEDURE — 99283 EMERGENCY DEPT VISIT LOW MDM: CPT

## 2018-09-28 NOTE — EMERGENCY ROOM REPORT
History of Present Illness


General


Chief Complaint:  Skin Rash/Abscess


Source:  Patient





Present Illness


HPI


Patient is a 20-year-old male presented after increased skin rash to his right 

upper extremity.  This of been present for approximately 5 days.  Patient had 

noticed increased swelling.  He had attempted to drain the fluid collection the 

with small amount purulent material.He denies any fever.  He denies any other 

locations of discomfort.


Allergies:  


Coded Allergies:  


     No Known Allergies (Unverified , 2/15/16)





Patient History


Reviewed Nursing Documentation:  PMH: Agreed





Nursing Documentation-PM


Past Medical History:  No History, Except For


Hx Hypertension:  No


Hx Asthma:  Yes





Physical Exam





Vital Signs








  Date Time  Temp Pulse Resp B/P (MAP) Pulse Ox O2 Delivery O2 Flow Rate FiO2


 


9/28/18 20:34 98.8 68 16 153/79 98 Room Air  





 98.8       








General Appearance:  well appearing, no apparent distress, alert, GCS 15, obese


Head:  normocephalic, atraumatic


ENT:  hearing grossly normal, normal voice


Neck:  full range of motion, supple


Respiratory:  no respiratory distress, speaking full sentences


Musculoskeletal:  no calf tenderness


Neurologic:  normal inspection, alert, oriented x3, normal gait


Psychiatric:  mood/affect normal


Skin:  other - small abscess to right forearm





Procedures


Incision and Drainage


Incision and Drainage :  


   Blade Size:  11


   I & D Procedure:  betadine prep, sterile drapes applied


   Wound Location:  upper extremity


   Wound's Depth, Shape:  superficial


   Wound Length (cm):  0


   Wound Explored:  clean


   Anesthesia:  1% Lidocaine


   Volume Anesthetic (ccs):  2


   Patient Tolerated:  Well


   Complications:  None


Progress


Small amount of purulent material.





Medical Decision Making


Diagnostic Impression:  


 Primary Impression:  


 Skin abscess


ER Course


Patient presented for skin rash.  Differential diagnosis included was not 

limited to abscess, cellulitis, folliculitis, necrotizing fasciitis.  The 

patient was noted to have what appears to be skin abscess.  The small amount 

purulent material was drained on incision and drainage.  The bacitracin was 

applied.  Patient knows no signs of systemic illness.





Last Vital Signs








  Date Time  Temp Pulse Resp B/P (MAP) Pulse Ox O2 Delivery O2 Flow Rate FiO2


 


9/28/18 20:44 98.8  16 153/79 98 Room Air  





 98.8       


 


9/28/18 20:34  68      








Status:  improved


Disposition:  HOME, SELF-CARE


Condition:  Stable


Scripts


Bacitracin Zinc* (BACITRACIN ZINC*) 1 Each Packet


1 APPLIC TOPIC THREE TIMES A DAY, #30 PACKET


   Prov: Vinay Alexander MD         9/28/18


Patient Instructions:  Abscess











Vinay Alexander MD Sep 28, 2018 21:19

## 2019-01-04 ENCOUNTER — HOSPITAL ENCOUNTER (EMERGENCY)
Dept: HOSPITAL 72 - EMR | Age: 29
LOS: 1 days | Discharge: HOME | End: 2019-01-05
Payer: MEDICAID

## 2019-01-04 VITALS — DIASTOLIC BLOOD PRESSURE: 67 MMHG | SYSTOLIC BLOOD PRESSURE: 158 MMHG

## 2019-01-04 VITALS — HEIGHT: 73 IN | WEIGHT: 315 LBS | BODY MASS INDEX: 41.75 KG/M2

## 2019-01-04 DIAGNOSIS — J06.9: Primary | ICD-10-CM

## 2019-01-04 DIAGNOSIS — R51: ICD-10-CM

## 2019-01-04 PROCEDURE — 99282 EMERGENCY DEPT VISIT SF MDM: CPT

## 2019-01-04 NOTE — NUR
ED Nurse Note:

 Pt arrived ED from Home, c/o coughing and right eye pain today. 5/10. Pt is 
A/O X4, Vital signs stable at this time. waitng for orders.

## 2019-01-05 VITALS — DIASTOLIC BLOOD PRESSURE: 71 MMHG | SYSTOLIC BLOOD PRESSURE: 152 MMHG

## 2019-01-05 NOTE — NUR
ED Nurse Note:

 Pt has seen by Dr. Warren, all orders carried out. Pt is  ready for discharge. 
D/c instruction and prescription given to Pt and verbalized understanding. ID 
band removed. Pt d/c from ED with steady gait.

## 2019-01-05 NOTE — EMERGENCY ROOM REPORT
History of Present Illness


General


Chief Complaint:  Upper Respiratory Illness


Source:  Patient





Present Illness


HPI


Is a 28-year-old male with no past medical history.  He presents with chief 

complaint of cough and congestion.  Also with headache.  Onset for last 5 days.

  Over-the-counter medication not helping.  No fever chills but no nausea no 

vomiting.  Denies any other complaint.  Pain is 8 out of 10.


Allergies:  


Coded Allergies:  


     No Known Allergies (Unverified , 2/15/16)





Patient History


Past Medical History:  none, see triage record, old chart reviewed


Past Surgical History:  none


Pertinent Family History:  none


Social History:  Denies: smoking


Immunizations:  other


Reviewed Nursing Documentation:  PMH: Agreed; PSxH: Agreed





Nursing Documentation-PMH


Past Medical History:  No History, Except For


Hx Hypertension:  No


Hx Asthma:  Yes





Review of Systems


Eye:  Denies: eye pain, blurred vision


ENT:  Reports: nose congestion; Denies: ear pain, throat swelling


Respiratory:  Reports: cough; Denies: shortness of breath


Cardiovascular:  Denies: chest pain, palpitations


Gastrointestinal:  Denies: abdominal pain, diarrhea, nausea, vomiting


Musculoskeletal:  Denies: back pain, joint pain


Skin:  Denies: rash


Neurological:  Denies: headache, numbness


Endocrine:  Denies: increased thirst, increased urine


Hematologic/Lymphatic:  Denies: easy bruising


All Other Systems:  negative except mentioned in HPI





Physical Exam





Vital Signs








  Date Time  Temp Pulse Resp B/P (MAP) Pulse Ox O2 Delivery O2 Flow Rate FiO2


 


1/4/19 23:40 98.1 78 16 162/98 97 Room Air  





vitals with high blood pressure


Sp02 EP Interpretation:  reviewed, normal


General Appearance:  well appearing, no apparent distress, alert


Head:  normocephalic, atraumatic


Eyes:  bilateral eye PERRL, bilateral eye EOMI


ENT:  hearing grossly normal, normal pharynx


Neck:  full range of motion, supple, no meningismus


Respiratory:  chest non-tender, lungs clear, normal breath sounds


Cardiovascular #1:  regular rate, rhythm, no murmur


Gastrointestinal:  normal bowel sounds, non tender, no mass, no organomegaly, 

no bruit, non-distended


Musculoskeletal:  back normal, gait/station normal, normal range of motion


Psychiatric:  mood/affect normal


Skin:  warm/dry





Medical Decision Making


Diagnostic Impression:  


 Primary Impression:  


 Upper respiratory infection


 Qualified Codes:  J06.9 - Acute upper respiratory infection, unspecified


ER Course


Patient with viral illness.  No focal deficit.  Blood pressure running little 

high here.  He said his blood pressure has been normal with recent visit to his 

primary care doctor.  No evidence of any meningitis, sepsis, pneumonia to name 

a few.





Last Vital Signs








  Date Time  Temp Pulse Resp B/P (MAP) Pulse Ox O2 Delivery O2 Flow Rate FiO2


 


1/4/19 23:40 98.1 78 16 162/98 97 Room Air  








Status:  unchanged


Disposition:  HOME, SELF-CARE


Condition:  Stable


Scripts


Tramadol Hcl* (ULTRAM*) 50 Mg Tablet


50 MG ORAL Q6H PRN for For Pain, #20 TAB 0 Refills


   Prov: Paul Warren MD         1/5/19


Patient Instructions:  Upper Respiratory Infection, Adult





Additional Instructions:  


Follow-up with your Dr. 7 days.  Return if symptom worsen.











Paul Warren MD Jan 5, 2019 00:35

## 2019-08-08 ENCOUNTER — HOSPITAL ENCOUNTER (EMERGENCY)
Dept: HOSPITAL 72 - EMR | Age: 29
Discharge: HOME | End: 2019-08-08
Payer: MEDICAID

## 2019-08-08 VITALS — SYSTOLIC BLOOD PRESSURE: 150 MMHG | DIASTOLIC BLOOD PRESSURE: 98 MMHG

## 2019-08-08 VITALS — WEIGHT: 315 LBS | BODY MASS INDEX: 41.75 KG/M2 | HEIGHT: 73 IN

## 2019-08-08 VITALS — DIASTOLIC BLOOD PRESSURE: 100 MMHG | SYSTOLIC BLOOD PRESSURE: 155 MMHG

## 2019-08-08 DIAGNOSIS — T14.8XXA: Primary | ICD-10-CM

## 2019-08-08 DIAGNOSIS — J06.9: ICD-10-CM
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
## 2019-08-08 PROCEDURE — 99282 EMERGENCY DEPT VISIT SF MDM: CPT

## 2019-08-08 NOTE — NUR
ED Nurse Note:

pt walked in to ED C/o cough for last 2.5 week with itchy throat.  Also c/o a 
discoloration to right lower leg which is tender to touch. pt is alert x4.
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
## 2020-01-01 ENCOUNTER — HOSPITAL ENCOUNTER (EMERGENCY)
Dept: HOSPITAL 72 - EMR | Age: 30
Discharge: HOME | End: 2020-01-01
Payer: MEDICAID

## 2020-01-01 VITALS — BODY MASS INDEX: 41.75 KG/M2 | HEIGHT: 73 IN | WEIGHT: 315 LBS

## 2020-01-01 VITALS — DIASTOLIC BLOOD PRESSURE: 86 MMHG | SYSTOLIC BLOOD PRESSURE: 130 MMHG

## 2020-01-01 DIAGNOSIS — M79.651: Primary | ICD-10-CM

## 2020-01-01 PROCEDURE — 99282 EMERGENCY DEPT VISIT SF MDM: CPT

## 2020-01-01 NOTE — NUR
ED Nurse Note:





pt walked in c/o right leg pain, pt states he was jumping at the gym and heard 
something pop, pt report 10/10 pain, noted pt limping, no sx deformity, cms 
intact, will cont monitor.

## 2020-01-01 NOTE — NUR
ED Nurse Note:



pt cleared to be d/c per ermd, pt discharge and aftercare instructions provided 
with prescriptions sent to pharmacy via electronically, pt education done via 
discussion and handout, pt advised to follow up with pcp or return to ED if 
changes in condition, vss, ambulatory w/ steady gait, left w/ all belongings, 
pt was educated regarding crutches, pt successfully demonstrated back.

## 2020-01-01 NOTE — EMERGENCY ROOM REPORT
History of Present Illness


General


Chief Complaint:  Lower Extremity Injury


Source:  Patient





Present Illness


HPI


Disclaimer: Please note that this report is being documented using DRAGON 

technology. This can lead to erroneous entry secondary to incorrect 

interpretation by the dictating instrument.





HPI: 29-year-old male presents for evaluation of right thigh pain.  He was 

doing box jumps at the gym when he felt a pop in his right lateral aspect of 

the thigh while jumping.  Denies fall or injury.  Noted pain over the lateral 

aspects that was initially radiating down to the right knee but now has 

localized to the mid thigh.  Able to ambulate safely.  Denies any pain in the 

patella, knee, hip, ankle.  Notes a cramping feeling in the mid thigh.  Denies 

any changes in sensation.


 


PMH: Asthma


 


PSH: Growth plate surgery as a kid


 


Allergies: Denies


 


Social Hx: Denies


Allergies:  


Coded Allergies:  


     No Known Allergies (Unverified , 2/15/16)





Nursing Documentation-PMH


Hx Hypertension:  No


Hx Asthma:  Yes





Review of Systems


All Other Systems:  negative except mentioned in HPI





Physical Exam





Vital Signs








  Date Time  Temp Pulse Resp B/P (MAP) Pulse Ox O2 Delivery O2 Flow Rate FiO2


 


1/1/20 20:24 98.4 76 16 133/86 (102) 96 Room Air  





 





General: Awake and alert, no acute distress


HEENT: NC/AT. EOMI. 


Resp: Normal work of breathing


Skin: Intact.  No abrasions, laceration or rash over the exposed skin


MSK: Normal tone and bulk. Moving all extremities.  No obvious deformity.  

Tenderness palpation over the lateral and anterior lateral aspect of the right 

thigh without palpable deformity.  No tenderness over the patella.  No 

tenderness around the knee joint.  No laxity on varus or valgus testing.  

Patient is able to flex and extend the leg without difficulty.  Able to bear 

weight and ambulate safely.  No tenderness or instability in the hip.  Strength 

is 5/5 at the hip, knee.


Neuro: Awake and alert.  Mentating appropriately.  Sensation intact over the 

dermatomes of lower extremities bilaterally.





Medical Decision Making


Diagnostic Impression:  


 Primary Impression:  


 Injury of lower extremity


ER Course


Is a 29-year-old male presenting for evaluation of right thigh pain after 

hearing a pop while doing box jumps at the gym.  May have torn, partially torn 

or strained 1 of his quadricep muscles.  Patient does not appear to have 

sustained any bony injury.  Able to ambulate safely and has full strength and 

sensation in the lower extremities.  No joint instability noted.  Do not 

believe he requires emergent imaging at this time but may benefit from an 

outpatient MRI if symptoms fail to improve with symptomatic treatment.  He will 

be given crutches and NSAIDs.  He states he will call his PMD in the morning 

for referral to an orthopedic surgeon in his network.  Discussed reasons to 

return to the emergency department.  Understands and agrees with this treatment 

plan.





Last Vital Signs








  Date Time  Temp Pulse Resp B/P (MAP) Pulse Ox O2 Delivery O2 Flow Rate FiO2


 


1/1/20 20:24 98.4 76 16 133/86 (102) 96 Room Air  








Disposition:  HOME, SELF-CARE


Condition:  Stable


Scripts


Ibuprofen* (MOTRIN*) 600 Mg Tablet


600 MG ORAL Q8H PRN for For Pain, #30 TAB 0 Refills


   Prov: Mikel Anguiano MD         1/1/20


Referrals:  


[facility]opedic [facility]gent [facility]





[facility]  **Open 24 hour /7 days a week by Appointment Only**





[address] [facility] [address]


[facility] [address]


Patient Instructions:  Muscle Tear





Additional Instructions:  


Please see your primary care doctor soon as possible for referral to orthopedic 

surgery.  You may require outpatient imaging to evaluate for strain versus tear 

of the quadricep muscle.  Use the crutches to get around safely.  Do not lift 

any weights while using the crutches as it can be dangerous.  Use Tylenol 

Motrin for pain and swelling.





Please follow-up with your primary care doctor in the next 1 to 3 days to 

discuss this emergency department visit and for reevaluation.  If you have any 

new or worsening symptoms please return to the emergency department for 

reevaluation.  





Please note that this report is being documented using DRAGON technology.


This can lead to erroneous entry secondary to incorrect interpretation by the 

dictating instrument.











Mikel Anguiano MD Jan 1, 2020 20:44

## 2020-09-27 ENCOUNTER — HOSPITAL ENCOUNTER (EMERGENCY)
Dept: HOSPITAL 72 - EMR | Age: 30
Discharge: HOME | End: 2020-09-27
Payer: MEDICAID

## 2020-09-27 VITALS — SYSTOLIC BLOOD PRESSURE: 140 MMHG | DIASTOLIC BLOOD PRESSURE: 92 MMHG

## 2020-09-27 VITALS — BODY MASS INDEX: 33.57 KG/M2 | HEIGHT: 75 IN | WEIGHT: 270 LBS

## 2020-09-27 DIAGNOSIS — L03.116: Primary | ICD-10-CM

## 2020-09-27 DIAGNOSIS — S20.219A: ICD-10-CM
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
## 2020-09-27 PROCEDURE — 99284 EMERGENCY DEPT VISIT MOD MDM: CPT

## 2020-09-27 PROCEDURE — 73590 X-RAY EXAM OF LOWER LEG: CPT

## 2020-09-27 PROCEDURE — 96372 THER/PROPH/DIAG INJ SC/IM: CPT

## 2020-09-27 PROCEDURE — 71250 CT THORAX DX C-: CPT

## 2020-09-27 NOTE — NUR
ER DISCHARGE NOTE:

Patient is cleared to be discharged per ERMD. Patient verbalized understanding 
of discharge instructions, ID band removed. Patient is A&OX4, ambulatory with 
steady gait. Patient currently having bacitracin placed on site and provided 
with additional gauze upon request. Patient departed with all belongings to 
home via his personal vehicle.
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
## 2020-10-03 ENCOUNTER — HOSPITAL ENCOUNTER (EMERGENCY)
Dept: HOSPITAL 72 - EMR | Age: 30
LOS: 1 days | Discharge: HOME | End: 2020-10-04
Payer: MEDICAID

## 2020-10-03 VITALS — HEIGHT: 73 IN | BODY MASS INDEX: 41.75 KG/M2 | WEIGHT: 315 LBS
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
## 2020-10-03 DIAGNOSIS — S20.212D: Primary | ICD-10-CM
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
## 2020-10-03 PROCEDURE — 99281 EMR DPT VST MAYX REQ PHY/QHP: CPT

## 2020-10-04 VITALS — SYSTOLIC BLOOD PRESSURE: 147 MMHG | DIASTOLIC BLOOD PRESSURE: 80 MMHG

## 2020-10-04 VITALS — SYSTOLIC BLOOD PRESSURE: 144 MMHG | DIASTOLIC BLOOD PRESSURE: 75 MMHG
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
